# Patient Record
Sex: MALE | Race: BLACK OR AFRICAN AMERICAN | NOT HISPANIC OR LATINO | Employment: PART TIME | URBAN - METROPOLITAN AREA
[De-identification: names, ages, dates, MRNs, and addresses within clinical notes are randomized per-mention and may not be internally consistent; named-entity substitution may affect disease eponyms.]

---

## 2019-01-31 ENCOUNTER — HOSPITAL ENCOUNTER (EMERGENCY)
Facility: HOSPITAL | Age: 24
Discharge: HOME | End: 2019-01-31
Attending: EMERGENCY MEDICINE
Payer: COMMERCIAL

## 2019-01-31 VITALS
RESPIRATION RATE: 17 BRPM | OXYGEN SATURATION: 98 % | DIASTOLIC BLOOD PRESSURE: 81 MMHG | SYSTOLIC BLOOD PRESSURE: 136 MMHG | HEIGHT: 70 IN | WEIGHT: 215 LBS | HEART RATE: 107 BPM | BODY MASS INDEX: 30.78 KG/M2 | TEMPERATURE: 99 F

## 2019-01-31 DIAGNOSIS — J03.90 TONSILLITIS: Primary | ICD-10-CM

## 2019-01-31 PROCEDURE — 99283 EMERGENCY DEPT VISIT LOW MDM: CPT

## 2019-01-31 RX ORDER — OXYCODONE HYDROCHLORIDE AND ACETAMINOPHEN 5; 325 MG/1; MG/1
1 TABLET ORAL ONCE
Status: COMPLETED | OUTPATIENT
Start: 2019-01-31 | End: 2019-01-31

## 2019-01-31 RX ORDER — AMOXICILLIN AND CLAVULANATE POTASSIUM 875; 125 MG/1; MG/1
1 TABLET, FILM COATED ORAL 2 TIMES DAILY
Qty: 20 TABLET | Refills: 0 | Status: SHIPPED | OUTPATIENT
Start: 2019-01-31 | End: 2019-02-10

## 2019-01-31 RX ORDER — AMOXICILLIN AND CLAVULANATE POTASSIUM 400; 57 MG/5ML; MG/5ML
875 POWDER, FOR SUSPENSION ORAL ONCE
Status: COMPLETED | OUTPATIENT
Start: 2019-01-31 | End: 2019-01-31

## 2019-01-31 RX ORDER — PREDNISONE 20 MG/1
60 TABLET ORAL ONCE
Status: COMPLETED | OUTPATIENT
Start: 2019-01-31 | End: 2019-01-31

## 2019-01-31 RX ORDER — OXYCODONE HYDROCHLORIDE AND ACETAMINOPHEN 5; 325 MG/1; MG/1
1 TABLET ORAL EVERY 6 HOURS PRN
Qty: 15 TABLET | Refills: 0 | Status: SHIPPED | OUTPATIENT
Start: 2019-01-31

## 2019-01-31 RX ADMIN — AMOXICILLIN AND CLAVULANATE POTASSIUM 875 MG OF AMOXICILLIN: 400; 57 POWDER, FOR SUSPENSION ORAL at 09:11

## 2019-01-31 RX ADMIN — PREDNISONE 60 MG: 20 TABLET ORAL at 09:03

## 2019-01-31 RX ADMIN — OXYCODONE HYDROCHLORIDE AND ACETAMINOPHEN 1 TABLET: 5; 325 TABLET ORAL at 09:04

## 2019-01-31 NOTE — ED PROVIDER NOTES
History  Chief Complaint   Patient presents with    Flu Symptoms     Pt reports flu symptoms since Friday  Severe sore throat   Sore Throat     HPI patient is a 80-year-old male, reports for 5 days of sore throat, pain with swallowing  Reports several other family members have pharyngitis  Patient reports today increasing pain and pain with swallowing  Patient reports he has significantly swollen tonsils  Complains of pain with swallowing  He denies any drooling  Complains of some cervical adenopathy  Patient reports he can swallow but is painful  He reports previous episodes of tonsillar swelling but none this significant  Past medical history previously healthy  Family history noncontributory  Social history nonsmoker no history of drug abuse, several family member sick    None       History reviewed  No pertinent past medical history  Past Surgical History:   Procedure Laterality Date    APPENDECTOMY         History reviewed  No pertinent family history  I have reviewed and agree with the history as documented  Social History   Substance Use Topics    Smoking status: Never Smoker    Smokeless tobacco: Never Used    Alcohol use Yes      Comment: occ        Review of Systems   Constitutional: Negative for diaphoresis, fatigue and fever  HENT: Positive for sore throat and trouble swallowing  Negative for congestion, ear pain and nosebleeds  Eyes: Negative for photophobia, pain, discharge and visual disturbance  Respiratory: Negative for cough, choking, chest tightness, shortness of breath and wheezing  Cardiovascular: Negative for chest pain and palpitations  Gastrointestinal: Negative for abdominal distention, abdominal pain, diarrhea and vomiting  Genitourinary: Negative for dysuria, flank pain and frequency  Musculoskeletal: Negative for back pain, gait problem and joint swelling  Skin: Negative for color change and rash     Neurological: Negative for dizziness, syncope and headaches  Psychiatric/Behavioral: Negative for behavioral problems and confusion  The patient is not nervous/anxious  All other systems reviewed and are negative  Physical Exam  Physical Exam   Constitutional: He is oriented to person, place, and time  He appears well-developed and well-nourished  HENT:   Head: Normocephalic  Right Ear: External ear normal    Left Ear: External ear normal    Nose: Nose normal    Bilateral tonsillar swelling, not touching, no uvular deviation, no stridor no drooling no swelling under the tongue   Eyes: Pupils are equal, round, and reactive to light  EOM and lids are normal    Neck: Normal range of motion  Neck supple  Cardiovascular: Normal rate, regular rhythm, normal heart sounds and intact distal pulses  Pulmonary/Chest: Effort normal and breath sounds normal  No respiratory distress  Musculoskeletal: Normal range of motion  He exhibits no deformity  Lymphadenopathy:     He has cervical adenopathy  Neurological: He is alert and oriented to person, place, and time  Skin: Skin is warm and dry  Psychiatric: He has a normal mood and affect  Nursing note and vitals reviewed     Pulse oximetry 98% on room air adequate oxygenation, no hypoxia  Vital Signs  ED Triage Vitals [01/31/19 0850]   Temperature Pulse Respirations Blood Pressure SpO2   99 °F (37 2 °C) (!) 107 17 136/81 98 %      Temp Source Heart Rate Source Patient Position - Orthostatic VS BP Location FiO2 (%)   Oral Monitor Lying Right arm --      Pain Score       8           Vitals:    01/31/19 0850   BP: 136/81   Pulse: (!) 107   Patient Position - Orthostatic VS: Lying       Visual Acuity      ED Medications  Medications   oxyCODONE-acetaminophen (PERCOCET) 5-325 mg per tablet 1 tablet (1 tablet Oral Given 1/31/19 0904)   amoxicillin-clavulanate (AUGMENTIN) 400-57 mg/5 mL oral suspension 875 mg of amoxicillin (875 mg of amoxicillin Oral Given 1/31/19 0911)   predniSONE tablet 60 mg (60 mg Oral Given 1/31/19 0903)       Diagnostic Studies  Results Reviewed     None                 No orders to display              Procedures  Procedures       Phone Contacts  ED Phone Contact    ED Course                patient tolerated oral medications, discussed outpatient treatment and follow-up                MDM  Medical decision making 71-year-old male presents with bilateral tonsillitis, no sign of peritonsillar abscess no toxicity, the patient can swallow and drink  Patient was able tolerate oral antibiotics here  We discussed analgesics to improve his ability to swallow  We discussed indications to return such as drooling difficulty swallowing or difficulty breathing  We discussed Otolaryngology follow-up  We discussed treatment with oral antibiotics  Disposition  Final diagnoses: Tonsillitis     Time reflects when diagnosis was documented in both MDM as applicable and the Disposition within this note     Time User Action Codes Description Comment    1/31/2019  9:01 AM Liliam Tabares Add [J03 90] Tonsillitis       ED Disposition     ED Disposition Condition Date/Time Comment    Discharge  u Jan 31, 2019  9:22 AM Oskar Moore discharge to home/self care  Condition at discharge: Good        Follow-up Information     Follow up With Specialties Details Why Contact Info    Helio Latham MD Otolaryngology   [address] [place] [address]    [address] [facility] [address]  [phone]            Patient's Medications   Discharge Prescriptions    AMOXICILLIN-CLAVULANATE (AUGMENTIN) 875-125 MG PER TABLET    Take 1 tablet by mouth 2 (two) times a day for 10 days       Start Date: 1/31/2019 End Date: 2/10/2019       Order Dose: 1 tablet       Quantity: 20 tablet    Refills: 0    OXYCODONE-ACETAMINOPHEN (PERCOCET) 5-325 MG PER TABLET    Take 1 tablet by mouth every 6 (six) hours as needed for severe pain for up to 15 doses Max Daily Amount: 4 tablets       Start Date: 1/31/2019 End Date: --       Order Dose: 1 tablet Quantity: 15 tablet    Refills: 0     No discharge procedures on file      ED Provider  Electronically Signed by           Raphael Dozier MD  01/31/19 3021

## 2019-01-31 NOTE — DISCHARGE INSTRUCTIONS
Augmentin twice daily to fight infection  Percocet 1 every 6 hr if needed for pain caution narcotic will make a sleepy-will help swallowing  Increase liquids  Motrin if needed for fever  Return increasing swelling problems swallowing or difficulty breathing  Follow up with Otolaryngology they have an office in [address] [place] [address] TO KNOW:   Tonsillitis is inflammation of your tonsils  Tonsils are the lumps of tissue on both sides of the back of your throat  Tonsils are part of your immune system  They help you fight infections  Recurrent tonsillitis is when you have tonsillitis many times in 1 year  Chronic tonsillitis is when you have a sore throat that lasts 3 months or longer  DISCHARGE INSTRUCTIONS:   Medicines: You may need any of the following:  · Acetaminophen  decreases pain and fever  It is available without a doctor's order  Ask how much to take and how often to take it  Follow directions  Acetaminophen can cause liver damage if not taken correctly  · NSAIDs , such as ibuprofen, help decrease swelling, pain, and fever  This medicine is available with or without a doctor's order  NSAIDs can cause stomach bleeding or kidney problems in certain people  If you take blood thinner medicine, always ask your healthcare provider if NSAIDs are safe for you  Always read the medicine label and follow directions  · Antibiotics  help treat a bacterial infection  · Take your medicine as directed  Contact your healthcare provider if you think your medicine is not helping or if you have side effects  Tell him or her if you are allergic to any medicine  Keep a list of the medicines, vitamins, and herbs you take  Include the amounts, and when and why you take them  Bring the list or the pill bottles to follow-up visits  Carry your medicine list with you in case of an emergency  Call 911 for the following:   · You have trouble breathing because your tonsils are swollen      Contact your healthcare provider if:   · You have a fever  · Your pain gets worse or does not get better after you take pain medicine  · Your sore throat is not better after you have finished antibiotic treatment  · You have trouble sleeping and wake up trying to catch your breath  · You have questions or concerns about your condition or care  Rest  when you feel it is needed  Slowly start to do more each day  Return to your daily activities as directed  Drink liquids as directed: You may need to drink more liquid than usual to help prevent dehydration  Ask how much liquid to drink each day and which liquids are best for you  Gargle with warm salt water: This may help decrease throat pain  Mix 1 teaspoon of salt in 8 ounces of warm water  Ask how often you should do this  Prevent the spread of germs:  Wash your hands often  Do not share food or drinks with anyone  You may be able to return to work when you feel better and your fever is gone for at least 24 hours  Follow up with your healthcare provider as directed:  Write down your questions so you remember to ask them during your visits  © 2017 [employer] Information is for End User's use only and may not be sold, redistributed or otherwise used for commercial purposes  All illustrations and images included in CareNotes® are the copyrighted property of A D A M , Inc  or Lu[employer]  The above information is an  only  It is not intended as medical advice for individual conditions or treatments  Talk to your doctor, nurse or pharmacist before following any medical regimen to see if it is safe and effective for you

## 2025-01-24 ENCOUNTER — OFFICE VISIT (OUTPATIENT)
Age: 30
End: 2025-01-24
Payer: COMMERCIAL

## 2025-01-24 VITALS
SYSTOLIC BLOOD PRESSURE: 138 MMHG | RESPIRATION RATE: 18 BRPM | HEIGHT: 70 IN | DIASTOLIC BLOOD PRESSURE: 69 MMHG | TEMPERATURE: 98.3 F | WEIGHT: 196 LBS | OXYGEN SATURATION: 96 % | HEART RATE: 91 BPM | BODY MASS INDEX: 28.06 KG/M2

## 2025-01-24 DIAGNOSIS — J02.0 STREP PHARYNGITIS: Primary | ICD-10-CM

## 2025-01-24 DIAGNOSIS — J02.9 SORE THROAT: ICD-10-CM

## 2025-01-24 LAB — S PYO AG THROAT QL: POSITIVE

## 2025-01-24 PROCEDURE — 87880 STREP A ASSAY W/OPTIC: CPT | Performed by: NURSE PRACTITIONER

## 2025-01-24 PROCEDURE — 99203 OFFICE O/P NEW LOW 30 MIN: CPT | Performed by: NURSE PRACTITIONER

## 2025-01-24 RX ORDER — AMOXICILLIN 500 MG/1
500 CAPSULE ORAL EVERY 8 HOURS SCHEDULED
Qty: 30 CAPSULE | Refills: 0 | Status: SHIPPED | OUTPATIENT
Start: 2025-01-24 | End: 2025-02-03

## 2025-01-24 NOTE — PATIENT INSTRUCTIONS
Amoxicillin  twice a day for 10 days   Tylenol/Motrin as needed for pain/fever  Throat lozenge, honey, salt water gargles  Increase fluid intake   Discard toothbrush 24 hours after starting antibiotic and again after finishing antibiotics  Follow up with your PCP for worsening symptoms    Patient Education     Strep throat in adults   The Basics   Written by the doctors and editors at [facility]   What is strep throat? -- Strep throat is an infection caused by a certain type of bacteria. It leads to a sore throat.  Most sore throats are caused by a virus, and are not strep throat. Only about 1 in 10 adults who seek medical care for sore throat have strep throat. But if you do have strep throat, you will need treatment with antibiotics.  How can I tell if I have strep throat? -- It is hard to tell the difference between strep throat and a sore throat caused by a virus. But there are some clues that you can look for.  People who have strep throat often have:   Severe throat pain   Fever (temperature higher than 100.4°F or 38°C)   Swollen glands in the neck  You might also be able to see redness on the roof of your mouth, or white patches in the back of your throat (figure 1).  People who have strep throat usually do not have a cough, runny nose, or itchy or red eyes. These symptoms are more common when the sore throat is caused by a virus.  Is there a test for strep throat? -- Yes. If you think that you might have strep throat, a doctor or nurse can easily check for it. They can run a swab along the back of your throat, and test it for the bacteria that cause strep throat.  Do I need antibiotics? -- Yes. If a test shows that you have strep throat, then you need antibiotics. Antibiotics can help reduce your symptoms and keep the infection from spreading to other people as easily.  Antibiotics can also prevent problems that strep throat can sometimes cause. These can happen if:   The body reacts to the infection - This  can cause symptoms like skin rash, joint pain, and even organ damage. In some cases, this can be serious.   The bacteria spread to nearby areas - For example, this could cause an ear, sinus, or skin infection. It could also cause swelling or abscesses (pockets of pus) in the throat.  You will probably be prescribed antibiotics to take for 10 days. It's important to take all of your antibiotics, even if you start to feel better sooner.  What can I do to feel better? -- Follow your doctor's instructions for taking your antibiotics.  There are also other ways to help relieve symptoms:   Take over-the-counter pain medicine - Acetaminophen (sample brand name: Tylenol) or ibuprofen (sample brand names: Advil, Motrin) can help with throat pain.   Use sore throat lozenges or sprays - Using medicated sore throat lozenges or throat sprays can temporarily reduce throat pain.   Suck on hard candies, ice chips, or ice pops.   Gargle with salt water - Some people find that this helps with throat pain.   Use a cool mist humidifier - This adds moisture to the air to keep the throat from getting too dry and might help with pain.   Avoid smoking or being around people who are smoking - Smoke can make throat pain worse.  When can I go back to work or school? -- Doctors usually recommend waiting 1 day after starting antibiotics before returning to work or school. By then, you will be a lot less likely to spread the infection to others.  What problems should I watch for? -- If strep throat is not treated with antibiotics, it can lead to other problems.  Call your doctor or nurse for advice if:   You are having trouble getting enough to eat or drink.   You still have symptoms after you finish your antibiotics.   You develop a red rash or peeling skin.   You develop joint pain within 1 month of having strep throat.   Your urine becomes red or brown.   You start having new symptoms.  What can I do to prevent getting strep throat again? --  Wash your hands often with soap and water (figure 2). This is one of the best ways to prevent the spread of infection.  All topics are updated as new evidence becomes available and our peer review process is complete.  This topic retrieved from [employer] on: Feb 26, 2024.  Topic 236777 Version 2.0  Release: 32.2.4 - C32.56  © 2024 UpToDate, Inc. and/or its affiliates. All rights reserved.  figure 1: Strep throat     Strep throat can make the roof of your mouth turn red and your tonsils white. It can also make your uvula swell.  Graphic 96719 Version 6.0  figure 2: How to wash your hands     Wet your hands with clean water, and apply a small amount of soap. Lather and rub hands together for at least 20 seconds. Clean your wrists, palms, backs of your hands, between your fingers, tips of your fingers, thumbs, and under and around your nails. Rinse well, and dry your hands using a clean towel.  Graphic 718518 Version 7.0  Consumer Information Use and Disclaimer   Disclaimer: This generalized information is a limited summary of diagnosis, treatment, and/or medication information. It is not meant to be comprehensive and should be used as a tool to help the user understand and/or assess potential diagnostic and treatment options. It does NOT include all information about conditions, treatments, medications, side effects, or risks that may apply to a specific patient. It is not intended to be medical advice or a substitute for the medical advice, diagnosis, or treatment of a health care provider based on the health care provider's examination and assessment of a patient's specific and unique circumstances. Patients must speak with a health care provider for complete information about their health, medical questions, and treatment options, including any risks or benefits regarding use of medications. This information does not endorse any treatments or medications as safe, effective, or approved for treating a specific  patient. UpToDate, Inc. and its affiliates disclaim any warranty or liability relating to this information or the use thereof.The use of this information is governed by the Terms of Use, available at https://www.[employer].com/en/know/clinical-effectiveness-terms. 2024© UpToDate, Inc. and its affiliates and/or licensors. All rights reserved.  Copyright   © 2024 UpToDate, Inc. and/or its affiliates. All rights reserved.

## 2025-01-24 NOTE — PROGRESS NOTES
[facility]        NAME: Oskar Valenzuela is a 29 y.o. male  : 1995    MRN: [id number]  DATE: 2025  TIME: 2:43 PM    Assessment and Plan   Strep pharyngitis [J02.0]  1. Strep pharyngitis  amoxicillin (AMOXIL) 500 mg capsule      2. Sore throat  POCT rapid strepA            Patient Instructions   Amoxicillin  twice a day for 10 days   Tylenol/Motrin as needed for pain/fever  Throat lozenge, honey, salt water gargles  Increase fluid intake   Discard toothbrush 24 hours after starting antibiotic and again after finishing antibiotics  Follow up with your PCP for worsening symptoms    Follow up with PCP in 3-5 days.  Proceed to  ER if symptoms worsen.    Chief Complaint     Chief Complaint   Patient presents with    Cold Like Symptoms     Started 3 days ago, patient complains of cough, congestion, headache, body aches, sore throat. Flu exposure.          History of Present Illness       Patient is a 29-year-old male presenting with 3 days of cough, congestion, headache, and sore throat.  He states he has had a fever as well.  He states that he is exposed to the flu and that he also gets tonsillitis once a year.  He states it feels like prior tonsillitis infections.      Review of Systems   Review of Systems   Constitutional:  Positive for chills and fever. Negative for activity change.   HENT:  Positive for congestion and sore throat. Negative for ear pain.    Respiratory:  Positive for cough.    Musculoskeletal:  Positive for myalgias.   Neurological:  Positive for headaches.         Current Medications       Current Outpatient Medications:     amoxicillin (AMOXIL) 500 mg capsule, Take 1 capsule (500 mg total) by mouth every 8 (eight) hours for 10 days, Disp: 30 capsule, Rfl: 0    azelastine (ASTELIN) 0.1 % nasal spray, 1 spray into each nostril 2 (two) times a day Use in each nostril as directed, Disp: 5 mL, Rfl: 3    fluticasone (FLONASE) 50 mcg/act nasal spray, 1 spray into each nostril  "2 (two) times a day (Patient not taking: Reported on 1/24/2025), Disp: 10 mL, Rfl: 3    oxyCODONE-acetaminophen (PERCOCET) 5-325 mg per tablet, Take 1 tablet by mouth every 6 (six) hours as needed for severe pain for up to 15 doses Max Daily Amount: 4 tablets (Patient not taking: Reported on 1/24/2025), Disp: 15 tablet, Rfl: 0    Current Allergies     Allergies as of 01/24/2025    (No Known Allergies)            The following portions of the patient's history were reviewed and updated as appropriate: allergies, current medications, past family history, past medical history, past social history, past surgical history and problem list.     Past Medical History:   Diagnosis Date    Hoarseness     Sinusitis     Sore throat     Trouble swallowing        Past Surgical History:   Procedure Laterality Date    ANTERIOR CRUCIATE LIGAMENT REPAIR      APPENDECTOMY         No family history on file.      Medications have been verified.        Objective   /69 (BP Location: Right arm, Patient Position: Sitting)   Pulse 91   Temp 98.3 °F (36.8 °C)   Resp 18   Ht 5' 10\" (1.778 m)   Wt 88.9 kg (196 lb)   SpO2 96%   BMI 28.12 kg/m²        Physical Exam     Physical Exam  Vitals reviewed.   Constitutional:       General: He is awake. He is not in acute distress.     Appearance: Normal appearance. He is normal weight.   HENT:      Head: Normocephalic.      Right Ear: Hearing, tympanic membrane, ear canal and external ear normal.      Left Ear: Hearing, tympanic membrane, ear canal and external ear normal.      Nose: Congestion present.      Mouth/Throat:      Lips: Pink.      Pharynx: Pharyngeal swelling, oropharyngeal exudate and posterior oropharyngeal erythema present.      Tonsils: 3+ on the right. 3+ on the left.   Cardiovascular:      Rate and Rhythm: Normal rate and regular rhythm.      Heart sounds: Normal heart sounds, S1 normal and S2 normal.   Pulmonary:      Effort: Pulmonary effort is normal.      Breath " sounds: Normal breath sounds. No decreased breath sounds, wheezing or rhonchi.   Skin:     General: Skin is warm and moist.   Neurological:      General: No focal deficit present.      Mental Status: He is alert and oriented to person, place, and time.   Psychiatric:         Behavior: Behavior is cooperative.

## 2025-01-24 NOTE — LETTER
January 24, 2025     Patient: Oksar Valenzuela   YOB: 1995   Date of Visit: 1/24/2025       To Whom it May Concern:    Oskar Valenzuela was seen in my clinic on 1/24/2025. He may return to work on 1/25/2025 .    If you have any questions or concerns, please don't hesitate to call.         Sincerely,          FELICIA Alexander        CC: No Recipients